# Patient Record
(demographics unavailable — no encounter records)

---

## 2024-12-18 NOTE — ASSESSMENT
[FreeTextEntry1] : Patient is stable clinically; Patient had a renal US today which revealed stable right renal mass and slight increase in the size of left renal mass with suggestion of vascularity.. She will be scheduled for a pre and post contrast CT scan for a better assessment of her renal anatomy (the patient was anxious about the IV contrast in the past but now understands the importance of the study as a baseline).  If the CT scan is unremarkable, she will follow-up in 6 months and down the road will be followed up with periodic renal ultrasounds.

## 2024-12-18 NOTE — PHYSICAL EXAM
[Normal Appearance] : normal appearance [Well Groomed] : well groomed [General Appearance - In No Acute Distress] : no acute distress [Edema] : no peripheral edema [Respiration, Rhythm And Depth] : normal respiratory rhythm and effort [Exaggerated Use Of Accessory Muscles For Inspiration] : no accessory muscle use [Abdomen Soft] : soft [Abdomen Tenderness] : non-tender [Costovertebral Angle Tenderness] : no ~M costovertebral angle tenderness [Urinary Bladder Findings] : the bladder was normal on palpation [Normal Station and Gait] : the gait and station were normal for the patient's age [] : no rash [No Focal Deficits] : no focal deficits [Oriented To Time, Place, And Person] : oriented to person, place, and time [Affect] : the affect was normal [Mood] : the mood was normal [No Palpable Adenopathy] : no palpable adenopathy [de-identified] : obese

## 2024-12-18 NOTE — HISTORY OF PRESENT ILLNESS
[FreeTextEntry1] : Patient has no new urological complaints. Patient is experiencing vague  flank pain and suprapubic discomfort. She is voiding with an adequate stream, nocturia x2, no dysuria or hematuria.

## 2024-12-18 NOTE — ADDENDUM
[FreeTextEntry1] : Next Visit: U/A,U/C, renal US    Entered by Pb Recio, acting as scribe and chaperone for Dr. Logan Post.    The documentation recorded by the scribe accurately reflects the service I personally performed and the decisions made by me.

## 2024-12-18 NOTE — PHYSICAL EXAM
[Normal Appearance] : normal appearance [Well Groomed] : well groomed [General Appearance - In No Acute Distress] : no acute distress [Edema] : no peripheral edema [Respiration, Rhythm And Depth] : normal respiratory rhythm and effort [Exaggerated Use Of Accessory Muscles For Inspiration] : no accessory muscle use [Abdomen Soft] : soft [Abdomen Tenderness] : non-tender [Costovertebral Angle Tenderness] : no ~M costovertebral angle tenderness [Urinary Bladder Findings] : the bladder was normal on palpation [Normal Station and Gait] : the gait and station were normal for the patient's age [] : no rash [No Focal Deficits] : no focal deficits [Oriented To Time, Place, And Person] : oriented to person, place, and time [Affect] : the affect was normal [Mood] : the mood was normal [No Palpable Adenopathy] : no palpable adenopathy [de-identified] : obese

## 2025-06-18 NOTE — ADDENDUM
[FreeTextEntry1] : Next Visit: U/A,U/C, renal US  Entered by Ava Kaminski, acting as scribe and chaperone for Dr. Logan Post.   The documentation recorded by the scribe accurately reflects the service I personally performed and the decisions made by me.

## 2025-06-18 NOTE — ASSESSMENT
[FreeTextEntry1] : Patient is stable clinically; she is voiding and emptying adequately. Renal ultrasound today revealed stable bilateral renal masses consistent with benign angiomyolipomas. We discussed the results of her imaging at length (she is very anxious about this). In view of her family history of kidney stones, we discussed the importance of maintaining adequate fluid intake. If all remains stable, she will follow-up in 6 months- she will have a renal ultrasound at that time.

## 2025-06-18 NOTE — HISTORY OF PRESENT ILLNESS
[FreeTextEntry1] : Patient comes in with no new voiding complaints. She is voiding with an adequate stream, no nocturia, flank pain, dysuria or hematuria. She reports occasionally holding her urine for extended periods of time. She reports overall adequate fluid intake. CT on 1/15/25 revealed bilateral fat-containing lesions compatible with angiomyolipomas. Today she will have a renal ultrasound.

## 2025-06-18 NOTE — LETTER BODY
[Dear  ___] : Dear  [unfilled], [Courtesy Letter:] : I had the pleasure of seeing your patient, [unfilled], in my office today. [Please see my note below.] : Please see my note below. [Consult Closing:] : Thank you very much for allowing me to participate in the care of this patient.  If you have any questions, please do not hesitate to contact me. [Sincerely,] : Sincerely, [FreeTextEntry3] : Logan Post MD